# Patient Record
Sex: FEMALE | Race: OTHER | URBAN - METROPOLITAN AREA
[De-identification: names, ages, dates, MRNs, and addresses within clinical notes are randomized per-mention and may not be internally consistent; named-entity substitution may affect disease eponyms.]

---

## 2017-10-17 ENCOUNTER — OFFICE VISIT (OUTPATIENT)
Dept: ORTHOPEDIC SURGERY | Age: 52
End: 2017-10-17

## 2017-10-17 VITALS
RESPIRATION RATE: 16 BRPM | OXYGEN SATURATION: 98 % | HEIGHT: 66 IN | SYSTOLIC BLOOD PRESSURE: 150 MMHG | TEMPERATURE: 97.4 F | HEART RATE: 81 BPM | DIASTOLIC BLOOD PRESSURE: 95 MMHG

## 2017-10-17 DIAGNOSIS — S93.401A SPRAIN OF RIGHT ANKLE, UNSPECIFIED LIGAMENT, INITIAL ENCOUNTER: Primary | ICD-10-CM

## 2017-10-17 DIAGNOSIS — M25.571 ACUTE RIGHT ANKLE PAIN: ICD-10-CM

## 2017-10-17 DIAGNOSIS — S96.911A ANKLE STRAIN, RIGHT, INITIAL ENCOUNTER: ICD-10-CM

## 2017-10-17 RX ORDER — IBUPROFEN 800 MG/1
TABLET ORAL
COMMUNITY

## 2017-10-17 NOTE — PATIENT INSTRUCTIONS
Wear CAM boot and use crutches as tolerated  Wear Tubigrip for swelling  Continue to take Ibuprofen as needed as directed. Follow up in 2 weeks or sooner as needed         Ankle Sprain: Care Instructions  Your Care Instructions    An ankle sprain can happen when you twist your ankle. The ligaments that support the ankle can get stretched and torn. Often the ankle is swollen and painful. Ankle sprains may take from several weeks to several months to heal. Usually, the more pain and swelling you have, the more severe your ankle sprain is and the longer it will take to heal. You can heal faster and regain strength in your ankle with good home treatment. It is very important to give your ankle time to heal completely, so that you do not easily hurt your ankle again. Follow-up care is a key part of your treatment and safety. Be sure to make and go to all appointments, and call your doctor if you are having problems. It's also a good idea to know your test results and keep a list of the medicines you take. How can you care for yourself at home? · Prop up your foot on pillows as much as possible for the next 3 days. Try to keep your ankle above the level of your heart. This will help reduce the swelling. · Follow your doctor's directions for wearing a splint or elastic bandage. Wrapping the ankle may help reduce or prevent swelling. · Your doctor may give you a splint, a brace, an air stirrup, or another form of ankle support to protect your ankle until it is healed. Wear it as directed while your ankle is healing. Do not remove it unless your doctor tells you to. After your ankle has healed, ask your doctor whether you should wear the brace when you exercise. · Put ice or cold packs on your injured ankle for 10 to 20 minutes at a time. Try to do this every 1 to 2 hours for the next 3 days (when you are awake) or until the swelling goes down. Put a thin cloth between the ice and your skin.   · You may need to use crutches until you can walk without pain. If you do use crutches, try to bear some weight on your injured ankle if you can do so without pain. This helps the ankle heal.  · Take pain medicines exactly as directed. ¨ If the doctor gave you a prescription medicine for pain, take it as prescribed. ¨ If you are not taking a prescription pain medicine, ask your doctor if you can take an over-the-counter medicine. · If you have been given ankle exercises to do at home, do them exactly as instructed. These can promote healing and help prevent lasting weakness. When should you call for help? Call your doctor now or seek immediate medical care if:  · Your pain is getting worse. · Your swelling is getting worse. · Your splint feels too tight or you are unable to loosen it. Watch closely for changes in your health, and be sure to contact your doctor if:  · You are not getting better after 1 week. Where can you learn more? Go to http://delfin-godfrey.info/. Enter Y851 in the search box to learn more about \"Ankle Sprain: Care Instructions. \"  Current as of: March 21, 2017  Content Version: 11.3  © 0235-3118 [employer]. Care instructions adapted under license by [employer] (which disclaims liability or warranty for this information). If you have questions about a medical condition or this instruction, always ask your healthcare professional. [place] [address] any warranty or liability for your use of this information.

## 2017-10-17 NOTE — PROGRESS NOTES
Patient: Kristin Dominguez                MRN: [id number]       SSN: [id number]  YOB: 1965               AGE: 47 y.o. SEX: female    PCP:No primary care provider on file. DOI: 10/13/17    Chief Complaint:   Chief Complaint   Patient presents with    Foot Pain     Right    Ankle Pain     Right       HPI AND PHYSICAL EXAM:     Kristin Dominguez is a 47 y.o. female who presents today for evaluation of right foot/ankle injury. Patient states that on Friday 10/13/17, her  had mopped the floor in the laundry room and she slid on the wet floor. She states that her right foot hit the floor. She could not WB initially so she rested Friday night. On Saturday, she was seen at [facility] and was placed in posterior splint and provided crutches. X-rays taken at [facility] of the right foot and ankle were reviewed on CD and revealed no acute fracture, dislocation or subluxation. There is an old avulsion fracture to medial malleolus that is stable. Visit Vitals    BP (!) 150/95 (BP 1 Location: Right arm, BP Patient Position: Sitting)    Pulse 81    Temp 97.4 °F (36.3 °C) (Oral)    Resp 16    Ht 5' 6\" (1.676 m)    SpO2 98%     Pain Scale: 7/10    GEN:  Alert, well developed, well nourished, well appearing 47 y.o. female in no acute distress. PSYCH:  Normal affect, mood, and conduct. alert, oriented x 3 alert, oriented x 3, no dementia  M/S EXAMINATION OF: right foot/ankle  SKIN: mild edema, no erythema, no ecchymosis, no warmth  TENDERNESS:  mild tenderness to palpation   NEUROVASCULAR:  grossly intact. Positive distal pulses and capillary refill. DVT ASSESSMENT:  The calf is not tender to palpation. No evidence of DVT seen on physical exam.  ROM:  Limited secondary to pain       IMPRESSION:     1. Sprain of right ankle, unspecified ligament, initial encounter    2. Acute right ankle pain    3.  Ankle strain, right, initial encounter          PLAN:         Orders Placed This Encounter    Generic Supply Order    ibuprofen (MOTRIN) 800 mg tablet               Wear CAM boot and use crutches as tolerated  Wear Tubigrip for swelling  Continue to take Ibuprofen as needed as directed. Follow up in 2 weeks or sooner as needed  Plan to transition patient to [address] [place] [address] at [facility] if she has improved            Patient has been discussed with Dr. Kat Conde during this visit and he agrees with the assessment and plan. Patient understands treatment plan and has been provided with patient education. PAST MEDICAL HISTORY:     No past medical history on file. MEDICATIONS:     Current Outpatient Prescriptions   Medication Sig    ibuprofen (MOTRIN) 800 mg tablet Take  by mouth. No current facility-administered medications for this visit. ALLERGIES:     Allergies   Allergen Reactions    Sulfa (Sulfonamide Antibiotics) Rash         PAST SURGICAL HISTORY:     No past surgical history on file. SOCIAL HISTORY:     Social History     Social History    Marital status: UNKNOWN     Spouse name: N/A    Number of children: N/A    Years of education: N/A     Occupational History    Not on file. Social History Main Topics    Smoking status: Not on file    Smokeless tobacco: Not on file    Alcohol use Not on file    Drug use: Not on file    Sexual activity: Not on file     Other Topics Concern    Not on file     Social History Narrative       FAMILY HISTORY:     No family history on file. REVIEW OF SYSTEMS:     Otherwise as noted in HPI      RADIOGRAPHS & DIAGNOSTIC STUDIES     No results found for any visits on 10/17/17. X-rays taken at [facility] of the right foot and ankle were reviewed on CD and revealed no acute fracture, dislocation or subluxation. There is an old avulsion fracture to medial malleolus that is stable.          Willi Medley PA-C  10/17/2017

## 2017-10-17 NOTE — MR AVS SNAPSHOT
Visit Information Date & Time Provider Department Dept. Phone Encounter #  
 10/17/2017  2:00 PM Domingo Smith MD [facility] [phone] Follow-up Instructions Return in about 2 weeks (around 10/31/2017) for follow up evaluation. Upcoming Health Maintenance Date Due Hepatitis C Screening 1965 DTaP/Tdap/Td series (1 - Tdap) 3/17/1985 PAP AKA CERVICAL CYTOLOGY 3/17/1985 BREAST CANCER SCRN MAMMOGRAM 3/17/2014 FOBT Q 1 YEAR AGE 50-75 3/17/2014 INFLUENZA AGE 9 TO ADULT 8/1/2017 Allergies as of 10/17/2017  Review Complete On: 10/17/2017 By: Brain Salazar PA-C Severity Noted Reaction Type Reactions Sulfa (Sulfonamide Antibiotics)  10/17/2017    Rash Current Immunizations  Never Reviewed No immunizations on file. Not reviewed this visit You Were Diagnosed With   
  
 Codes Comments Sprain of right ankle, unspecified ligament, initial encounter    -  Primary ICD-10-CM: E20.505M ICD-9-CM: 845.00 Acute right ankle pain     ICD-10-CM: M25.571 ICD-9-CM: 719.47, 338.19 Ankle strain, right, initial encounter     ICD-10-CM: A96.000E ICD-9-CM: 845.00 Vitals BP Pulse Temp Resp Height(growth percentile) SpO2  
 (!) 150/95 (BP 1 Location: Right arm, BP Patient Position: Sitting) 81 97.4 °F (36.3 °C) (Oral) 16 5' 6\" (1.676 m) 98% Your Updated Medication List  
  
   
This list is accurate as of: 10/17/17  3:44 PM.  Always use your most recent med list.  
  
  
  
  
 ibuprofen 800 mg tablet Commonly known as:  MOTRIN Take  by mouth. Follow-up Instructions Return in about 2 weeks (around 10/31/2017) for follow up evaluation. Patient Instructions Wear CAM boot and use crutches as tolerated Wear Tubigrip for swelling Continue to take Ibuprofen as needed as directed. Follow up in 2 weeks or sooner as needed Ankle Sprain: Care Instructions Your Care Instructions An ankle sprain can happen when you twist your ankle. The ligaments that support the ankle can get stretched and torn. Often the ankle is swollen and painful. Ankle sprains may take from several weeks to several months to heal. Usually, the more pain and swelling you have, the more severe your ankle sprain is and the longer it will take to heal. You can heal faster and regain strength in your ankle with good home treatment. It is very important to give your ankle time to heal completely, so that you do not easily hurt your ankle again. Follow-up care is a key part of your treatment and safety. Be sure to make and go to all appointments, and call your doctor if you are having problems. It's also a good idea to know your test results and keep a list of the medicines you take. How can you care for yourself at home? · Prop up your foot on pillows as much as possible for the next 3 days. Try to keep your ankle above the level of your heart. This will help reduce the swelling. · Follow your doctor's directions for wearing a splint or elastic bandage. Wrapping the ankle may help reduce or prevent swelling. · Your doctor may give you a splint, a brace, an air stirrup, or another form of ankle support to protect your ankle until it is healed. Wear it as directed while your ankle is healing. Do not remove it unless your doctor tells you to. After your ankle has healed, ask your doctor whether you should wear the brace when you exercise. · Put ice or cold packs on your injured ankle for 10 to 20 minutes at a time. Try to do this every 1 to 2 hours for the next 3 days (when you are awake) or until the swelling goes down. Put a thin cloth between the ice and your skin. · You may need to use crutches until you can walk without pain. If you do use crutches, try to bear some weight on your injured ankle if you can do so without pain.  This helps the ankle heal. 
 · Take pain medicines exactly as directed. ¨ If the doctor gave you a prescription medicine for pain, take it as prescribed. ¨ If you are not taking a prescription pain medicine, ask your doctor if you can take an over-the-counter medicine. · If you have been given ankle exercises to do at home, do them exactly as instructed. These can promote healing and help prevent lasting weakness. When should you call for help? Call your doctor now or seek immediate medical care if: 
· Your pain is getting worse. · Your swelling is getting worse. · Your splint feels too tight or you are unable to loosen it. Watch closely for changes in your health, and be sure to contact your doctor if: 
· You are not getting better after 1 week. Where can you learn more? Go to http://delfin-godfrey.info/. Enter O955 in the search box to learn more about \"Ankle Sprain: Care Instructions. \" Current as of: March 21, 2017 Content Version: 11.3 © 8645-2265 [employer]. Care instructions adapted under license by [employer] (which disclaims liability or warranty for this information). If you have questions about a medical condition or this instruction, always ask your healthcare professional. [address] any warranty or liability for your use of this information. Introducing [facility]! [facility] introduces Friend.ly patient portal. Now you can access parts of your medical record, email your doctor's office, and request medication refills online. 1. In your internet browser, go to https://Intelligent Mobile Support. NCR/[employer]elligent Mobile Support 2. Click on the First Time User? Click Here link in the Sign In box. You will see the New Member Sign Up page. 3. Enter your Friend.ly Access Code exactly as it appears below. You will not need to use this code after youve completed the sign-up process. If you do not sign up before the expiration date, you must request a new code. · Bluenog Access Code: [email or web address] Expires: 1/15/2018  3:44 PM 
 
4. Enter the last four digits of your Social Security Number (xxxx) and Date of Birth (mm/dd/yyyy) as indicated and click Submit. You will be taken to the next sign-up page. 5. Create a Bluenog ID. This will be your Bluenog login ID and cannot be changed, so think of one that is secure and easy to remember. 6. Create a Bluenog password. You can change your password at any time. 7. Enter your Password Reset Question and Answer. This can be used at a later time if you forget your password. 8. Enter your e-mail address. You will receive e-mail notification when new information is available in [address] 9. Click Sign Up. You can now view and download portions of your medical record. 10. Click the Download Summary menu link to download a portable copy of your medical information. If you have questions, please visit the Frequently Asked Questions section of the Bluenog website. Remember, Bluenog is NOT to be used for urgent needs. For medical emergencies, dial 911. Now available from your iPhone and Android! Please provide this summary of care documentation to your next provider. If you have any questions after today's visit, please call [phone].